# Patient Record
Sex: FEMALE | Race: WHITE | NOT HISPANIC OR LATINO | Employment: OTHER | URBAN - METROPOLITAN AREA
[De-identification: names, ages, dates, MRNs, and addresses within clinical notes are randomized per-mention and may not be internally consistent; named-entity substitution may affect disease eponyms.]

---

## 2017-01-04 ENCOUNTER — HOSPITAL ENCOUNTER (OUTPATIENT)
Dept: LAB | Facility: HOSPITAL | Age: 62
Setting detail: SPECIMEN
Discharge: HOME | End: 2017-01-04
Attending: INTERNAL MEDICINE | Admitting: INTERNAL MEDICINE

## 2017-01-04 LAB
ALBUMIN SERPL-MCNC: 3.8 G/DL (ref 3.5–4.8)
ALBUMIN/GLOB SERPL: 1.2 {RATIO} (ref 1–1.7)
ALP SERPL-CCNC: 67 IU/L (ref 32–91)
ALT SERPL-CCNC: 20 IU/L (ref 14–54)
ANION GAP SERPL CALC-SCNC: 13.7 MMOL/L (ref 10–20)
AST SERPL-CCNC: 24 IU/L (ref 15–41)
BILIRUB SERPL-MCNC: 0.8 MG/DL (ref 0.3–1.2)
BUN SERPL-MCNC: 13 MG/DL (ref 8–20)
BUN/CREAT SERPL: 26 (ref 5.4–26.2)
CALCIUM SERPL-MCNC: 9.9 MG/DL (ref 8.9–10.3)
CHLORIDE SERPL-SCNC: 106 MMOL/L (ref 101–111)
CHOLEST SERPL-MCNC: 199 MG/DL
CHOLEST/HDLC SERPL: 1.9 {RATIO}
CONV CO2: 28 MMOL/L (ref 22–32)
CONV LDL CHOLESTEROL DIRECT: 79 MG/DL (ref 0–100)
CONV TOTAL PROTEIN: 7 G/DL (ref 6.1–7.9)
CREAT UR-MCNC: 0.5 MG/DL (ref 0.4–1)
GLOBULIN UR ELPH-MCNC: 3.2 G/DL (ref 2.5–3.8)
GLUCOSE SERPL-MCNC: ABNORMAL MG/DL (ref 65–99)
HDLC SERPL-MCNC: 105 MG/DL
LDLC/HDLC SERPL: 0.8 {RATIO}
LIPID INTERPRETATION: NORMAL
POTASSIUM SERPL-SCNC: 3.7 MMOL/L (ref 3.6–5.1)
SODIUM SERPL-SCNC: 144 MMOL/L (ref 136–144)
TRIGL SERPL-MCNC: 39 MG/DL
TSH SERPL-ACNC: 1.55 UIU/ML (ref 0.34–5.6)
VLDLC SERPL CALC-MCNC: 15.4 MG/DL

## 2017-01-09 ENCOUNTER — CONVERSION ENCOUNTER (OUTPATIENT)
Dept: ENDOCRINOLOGY | Facility: CLINIC | Age: 62
End: 2017-01-09

## 2017-04-04 ENCOUNTER — HOSPITAL ENCOUNTER (OUTPATIENT)
Dept: LAB | Facility: HOSPITAL | Age: 62
Setting detail: SPECIMEN
Discharge: HOME | End: 2017-04-04
Attending: INTERNAL MEDICINE | Admitting: INTERNAL MEDICINE

## 2017-04-04 LAB
ALBUMIN SERPL-MCNC: 3.6 G/DL (ref 3.5–4.8)
ALBUMIN/GLOB SERPL: 1.2 {RATIO} (ref 1–1.7)
ALP SERPL-CCNC: 61 IU/L (ref 32–91)
ALT SERPL-CCNC: 21 IU/L (ref 14–54)
ANION GAP SERPL CALC-SCNC: 12.6 MMOL/L (ref 10–20)
AST SERPL-CCNC: 22 IU/L (ref 15–41)
BILIRUB SERPL-MCNC: 1.6 MG/DL (ref 0.3–1.2)
BUN SERPL-MCNC: 14 MG/DL (ref 8–20)
BUN/CREAT SERPL: 23.3 (ref 5.4–26.2)
CALCIUM SERPL-MCNC: 9.8 MG/DL (ref 8.9–10.3)
CHLORIDE SERPL-SCNC: 106 MMOL/L (ref 101–111)
CHOLEST SERPL-MCNC: 195 MG/DL
CHOLEST/HDLC SERPL: 2.3 {RATIO}
CONV CO2: 27 MMOL/L (ref 22–32)
CONV LDL CHOLESTEROL DIRECT: 87 MG/DL (ref 0–100)
CONV TOTAL PROTEIN: 6.7 G/DL (ref 6.1–7.9)
CREAT UR-MCNC: 0.6 MG/DL (ref 0.4–1)
GLOBULIN UR ELPH-MCNC: 3.1 G/DL (ref 2.5–3.8)
GLUCOSE SERPL-MCNC: 86 MG/DL (ref 65–99)
HDLC SERPL-MCNC: 86 MG/DL
LDLC/HDLC SERPL: 1 {RATIO}
LIPID INTERPRETATION: ABNORMAL
POTASSIUM SERPL-SCNC: 3.6 MMOL/L (ref 3.6–5.1)
SODIUM SERPL-SCNC: 142 MMOL/L (ref 136–144)
TRIGL SERPL-MCNC: 38 MG/DL
VLDLC SERPL CALC-MCNC: 22.5 MG/DL

## 2017-04-11 ENCOUNTER — CONVERSION ENCOUNTER (OUTPATIENT)
Dept: ENDOCRINOLOGY | Facility: CLINIC | Age: 62
End: 2017-04-11

## 2017-07-07 ENCOUNTER — HOSPITAL ENCOUNTER (OUTPATIENT)
Dept: LAB | Facility: HOSPITAL | Age: 62
Setting detail: SPECIMEN
Discharge: HOME | End: 2017-07-07
Attending: NURSE PRACTITIONER | Admitting: NURSE PRACTITIONER

## 2017-07-07 LAB
ALBUMIN SERPL-MCNC: 3.9 G/DL (ref 3.5–4.8)
ALBUMIN/GLOB SERPL: 1.3 {RATIO} (ref 1–1.7)
ALP SERPL-CCNC: 77 IU/L (ref 32–91)
ALT SERPL-CCNC: 29 IU/L (ref 14–54)
ANION GAP SERPL CALC-SCNC: 13.8 MMOL/L (ref 10–20)
AST SERPL-CCNC: 27 IU/L (ref 15–41)
BILIRUB SERPL-MCNC: 1.1 MG/DL (ref 0.3–1.2)
BUN SERPL-MCNC: 20 MG/DL (ref 8–20)
BUN/CREAT SERPL: 28.6 (ref 5.4–26.2)
CALCIUM SERPL-MCNC: 10.3 MG/DL (ref 8.9–10.3)
CHLORIDE SERPL-SCNC: 102 MMOL/L (ref 101–111)
CHOLEST SERPL-MCNC: 190 MG/DL
CHOLEST/HDLC SERPL: 2.1 {RATIO}
CONV CO2: 28 MMOL/L (ref 22–32)
CONV LDL CHOLESTEROL DIRECT: 85 MG/DL (ref 0–100)
CONV MICROALBUM.,U,RANDOM: 36 MG/L
CONV TOTAL PROTEIN: 7 G/DL (ref 6.1–7.9)
CREAT 24H UR-MCNC: 184.7 MG/DL
CREAT UR-MCNC: 0.7 MG/DL (ref 0.4–1)
GLOBULIN UR ELPH-MCNC: 3.1 G/DL (ref 2.5–3.8)
GLUCOSE SERPL-MCNC: 73 MG/DL (ref 65–99)
HDLC SERPL-MCNC: 91 MG/DL
LDLC/HDLC SERPL: 0.9 {RATIO}
LIPID INTERPRETATION: NORMAL
MICROALBUMIN/CREAT UR: 19.5 UG/MG
POTASSIUM SERPL-SCNC: 3.8 MMOL/L (ref 3.6–5.1)
SODIUM SERPL-SCNC: 140 MMOL/L (ref 136–144)
TRIGL SERPL-MCNC: 51 MG/DL
VLDLC SERPL CALC-MCNC: 14.1 MG/DL

## 2017-07-14 ENCOUNTER — CONVERSION ENCOUNTER (OUTPATIENT)
Dept: ENDOCRINOLOGY | Facility: CLINIC | Age: 62
End: 2017-07-14

## 2017-10-06 ENCOUNTER — HOSPITAL ENCOUNTER (OUTPATIENT)
Dept: LAB | Facility: HOSPITAL | Age: 62
Setting detail: SPECIMEN
Discharge: HOME | End: 2017-10-06
Attending: NURSE PRACTITIONER | Admitting: NURSE PRACTITIONER

## 2017-10-06 LAB
ALBUMIN SERPL-MCNC: 3.7 G/DL (ref 3.5–4.8)
ALBUMIN/GLOB SERPL: 1.1 {RATIO} (ref 1–1.7)
ALP SERPL-CCNC: 85 IU/L (ref 32–91)
ALT SERPL-CCNC: 27 IU/L (ref 14–54)
ANION GAP SERPL CALC-SCNC: 9.3 MMOL/L (ref 10–20)
AST SERPL-CCNC: 23 IU/L (ref 15–41)
BILIRUB SERPL-MCNC: 1.1 MG/DL (ref 0.3–1.2)
BUN SERPL-MCNC: 18 MG/DL (ref 8–20)
BUN/CREAT SERPL: 30 (ref 5.4–26.2)
CALCIUM SERPL-MCNC: 9.6 MG/DL (ref 8.9–10.3)
CHLORIDE SERPL-SCNC: 105 MMOL/L (ref 101–111)
CONV CO2: 28 MMOL/L (ref 22–32)
CONV MICROALBUM.,U,RANDOM: 9 MG/L
CONV TOTAL PROTEIN: 7 G/DL (ref 6.1–7.9)
CREAT 24H UR-MCNC: 64.2 MG/DL
CREAT UR-MCNC: 0.6 MG/DL (ref 0.4–1)
GLOBULIN UR ELPH-MCNC: 3.3 G/DL (ref 2.5–3.8)
GLUCOSE SERPL-MCNC: 317 MG/DL (ref 65–99)
MICROALBUMIN/CREAT UR: 14 UG/MG
POTASSIUM SERPL-SCNC: 4.3 MMOL/L (ref 3.6–5.1)
SODIUM SERPL-SCNC: 138 MMOL/L (ref 136–144)
TSH SERPL-ACNC: 0.64 UIU/ML (ref 0.34–5.6)

## 2017-10-13 ENCOUNTER — CONVERSION ENCOUNTER (OUTPATIENT)
Dept: ENDOCRINOLOGY | Facility: CLINIC | Age: 62
End: 2017-10-13

## 2018-01-05 ENCOUNTER — HOSPITAL ENCOUNTER (OUTPATIENT)
Dept: LAB | Facility: HOSPITAL | Age: 63
Setting detail: SPECIMEN
Discharge: HOME | End: 2018-01-05
Attending: INTERNAL MEDICINE | Admitting: INTERNAL MEDICINE

## 2018-01-05 LAB
ALBUMIN SERPL-MCNC: 3.7 G/DL (ref 3.5–4.8)
ALBUMIN/GLOB SERPL: 1 {RATIO} (ref 1–1.7)
ALP SERPL-CCNC: 84 IU/L (ref 32–91)
ALT SERPL-CCNC: 21 IU/L (ref 14–54)
ANION GAP SERPL CALC-SCNC: 11.5 MMOL/L (ref 10–20)
AST SERPL-CCNC: 23 IU/L (ref 15–41)
BILIRUB SERPL-MCNC: 1.3 MG/DL (ref 0.3–1.2)
BUN SERPL-MCNC: 12 MG/DL (ref 8–20)
BUN/CREAT SERPL: 20 (ref 5.4–26.2)
CALCIUM SERPL-MCNC: 10.1 MG/DL (ref 8.9–10.3)
CHLORIDE SERPL-SCNC: 107 MMOL/L (ref 101–111)
CHOLEST SERPL-MCNC: 191 MG/DL
CHOLEST/HDLC SERPL: 1.9 {RATIO}
CONV CO2: 28 MMOL/L (ref 22–32)
CONV LDL CHOLESTEROL DIRECT: 75 MG/DL (ref 0–100)
CONV MICROALBUM.,U,RANDOM: 16 MG/L
CONV TOTAL PROTEIN: 7.4 G/DL (ref 6.1–7.9)
CREAT 24H UR-MCNC: 74.9 MG/DL
CREAT UR-MCNC: 0.6 MG/DL (ref 0.4–1)
GLOBULIN UR ELPH-MCNC: 3.7 G/DL (ref 2.5–3.8)
GLUCOSE SERPL-MCNC: 141 MG/DL (ref 65–99)
HDLC SERPL-MCNC: 98 MG/DL
LDLC/HDLC SERPL: 0.8 {RATIO}
LIPID INTERPRETATION: NORMAL
MICROALBUMIN/CREAT UR: 21.4 UG/MG
POTASSIUM SERPL-SCNC: 4.5 MMOL/L (ref 3.6–5.1)
SODIUM SERPL-SCNC: 142 MMOL/L (ref 136–144)
TRIGL SERPL-MCNC: 51 MG/DL
VLDLC SERPL CALC-MCNC: 18.2 MG/DL

## 2018-01-10 ENCOUNTER — CONVERSION ENCOUNTER (OUTPATIENT)
Dept: ENDOCRINOLOGY | Facility: CLINIC | Age: 63
End: 2018-01-10

## 2018-05-04 ENCOUNTER — HOSPITAL ENCOUNTER (OUTPATIENT)
Dept: LAB | Facility: HOSPITAL | Age: 63
Setting detail: SPECIMEN
Discharge: HOME | End: 2018-05-04
Attending: NURSE PRACTITIONER | Admitting: NURSE PRACTITIONER

## 2018-05-04 LAB
ALBUMIN SERPL-MCNC: 3.5 G/DL (ref 3.5–4.8)
ALBUMIN/GLOB SERPL: 1 {RATIO} (ref 1–1.7)
ALP SERPL-CCNC: 87 IU/L (ref 32–91)
ALT SERPL-CCNC: 23 IU/L (ref 14–54)
ANION GAP SERPL CALC-SCNC: 11.6 MMOL/L (ref 10–20)
AST SERPL-CCNC: 23 IU/L (ref 15–41)
BILIRUB SERPL-MCNC: 1.2 MG/DL (ref 0.3–1.2)
BUN SERPL-MCNC: 9 MG/DL (ref 8–20)
BUN/CREAT SERPL: 12.9 (ref 5.4–26.2)
CALCIUM SERPL-MCNC: 9.6 MG/DL (ref 8.9–10.3)
CHLORIDE SERPL-SCNC: 104 MMOL/L (ref 101–111)
CONV CO2: 28 MMOL/L (ref 22–32)
CONV MICROALBUM.,U,RANDOM: 11 MG/L
CONV TOTAL PROTEIN: 7 G/DL (ref 6.1–7.9)
CREAT 24H UR-MCNC: 82.4 MG/DL
CREAT UR-MCNC: 0.7 MG/DL (ref 0.4–1)
GLOBULIN UR ELPH-MCNC: 3.5 G/DL (ref 2.5–3.8)
GLUCOSE SERPL-MCNC: 224 MG/DL (ref 65–99)
MICROALBUMIN/CREAT UR: 13.3 UG/MG
POTASSIUM SERPL-SCNC: 3.6 MMOL/L (ref 3.6–5.1)
SODIUM SERPL-SCNC: 140 MMOL/L (ref 136–144)

## 2018-05-11 ENCOUNTER — CONVERSION ENCOUNTER (OUTPATIENT)
Dept: ENDOCRINOLOGY | Facility: CLINIC | Age: 63
End: 2018-05-11

## 2018-10-29 ENCOUNTER — HOSPITAL ENCOUNTER (OUTPATIENT)
Dept: LAB | Facility: HOSPITAL | Age: 63
Setting detail: SPECIMEN
Discharge: HOME | End: 2018-10-29
Attending: INTERNAL MEDICINE | Admitting: INTERNAL MEDICINE

## 2018-10-29 LAB
ALBUMIN SERPL-MCNC: 3.8 G/DL (ref 3.5–4.8)
ALBUMIN/GLOB SERPL: 1 {RATIO} (ref 1–1.7)
ALP SERPL-CCNC: 119 IU/L (ref 32–91)
ALT SERPL-CCNC: 21 IU/L (ref 14–54)
ANION GAP SERPL CALC-SCNC: 11.8 MMOL/L (ref 10–20)
AST SERPL-CCNC: 19 IU/L (ref 15–41)
BILIRUB SERPL-MCNC: 1.1 MG/DL (ref 0.3–1.2)
BUN SERPL-MCNC: 16 MG/DL (ref 8–20)
BUN/CREAT SERPL: 26.7 (ref 5.4–26.2)
CALCIUM SERPL-MCNC: 9.9 MG/DL (ref 8.9–10.3)
CHLORIDE SERPL-SCNC: 103 MMOL/L (ref 101–111)
CHOLEST SERPL-MCNC: 188 MG/DL
CHOLEST/HDLC SERPL: 2.1 {RATIO}
CONV CO2: 29 MMOL/L (ref 22–32)
CONV LDL CHOLESTEROL DIRECT: 84 MG/DL (ref 0–100)
CONV MICROALBUM.,U,RANDOM: 25 MG/L
CONV TOTAL PROTEIN: 7.5 G/DL (ref 6.1–7.9)
CREAT 24H UR-MCNC: 101.9 MG/DL
CREAT UR-MCNC: 0.6 MG/DL (ref 0.4–1)
GLOBULIN UR ELPH-MCNC: 3.7 G/DL (ref 2.5–3.8)
GLUCOSE SERPL-MCNC: 164 MG/DL (ref 65–99)
HDLC SERPL-MCNC: 91 MG/DL
LDLC/HDLC SERPL: 0.9 {RATIO}
LIPID INTERPRETATION: NORMAL
MICROALBUMIN/CREAT UR: 24.5 UG/MG
POTASSIUM SERPL-SCNC: 3.8 MMOL/L (ref 3.6–5.1)
SODIUM SERPL-SCNC: 140 MMOL/L (ref 136–144)
TRIGL SERPL-MCNC: 55 MG/DL
TSH SERPL-ACNC: 1.32 UIU/ML (ref 0.34–5.6)
VLDLC SERPL CALC-MCNC: 13.1 MG/DL

## 2018-10-30 LAB — HBA1C MFR BLD: 11.1 % (ref 0–5.6)

## 2018-11-05 ENCOUNTER — CONVERSION ENCOUNTER (OUTPATIENT)
Dept: ENDOCRINOLOGY | Facility: CLINIC | Age: 63
End: 2018-11-05

## 2019-03-29 ENCOUNTER — CONVERSION ENCOUNTER (OUTPATIENT)
Dept: ENDOCRINOLOGY | Facility: CLINIC | Age: 64
End: 2019-03-29

## 2019-04-06 ENCOUNTER — HOSPITAL ENCOUNTER (OUTPATIENT)
Dept: ULTRASOUND IMAGING | Facility: HOSPITAL | Age: 64
Discharge: HOME | End: 2019-04-06
Attending: INTERNAL MEDICINE | Admitting: INTERNAL MEDICINE

## 2019-04-12 ENCOUNTER — HOSPITAL ENCOUNTER (OUTPATIENT)
Dept: LAB | Facility: HOSPITAL | Age: 64
Setting detail: SPECIMEN
Discharge: HOME | End: 2019-04-12
Attending: INTERNAL MEDICINE | Admitting: INTERNAL MEDICINE

## 2019-05-17 ENCOUNTER — HOSPITAL ENCOUNTER (OUTPATIENT)
Dept: ULTRASOUND IMAGING | Facility: HOSPITAL | Age: 64
Discharge: HOME | End: 2019-05-17
Attending: INTERNAL MEDICINE | Admitting: INTERNAL MEDICINE

## 2019-06-04 VITALS
DIASTOLIC BLOOD PRESSURE: 70 MMHG | HEART RATE: 73 BPM | DIASTOLIC BLOOD PRESSURE: 70 MMHG | HEART RATE: 94 BPM | BODY MASS INDEX: 23.92 KG/M2 | HEIGHT: 62 IN | HEIGHT: 62 IN | HEIGHT: 62 IN | SYSTOLIC BLOOD PRESSURE: 125 MMHG | HEART RATE: 78 BPM | SYSTOLIC BLOOD PRESSURE: 140 MMHG | DIASTOLIC BLOOD PRESSURE: 75 MMHG | DIASTOLIC BLOOD PRESSURE: 84 MMHG | OXYGEN SATURATION: 98 % | WEIGHT: 128 LBS | DIASTOLIC BLOOD PRESSURE: 75 MMHG | HEART RATE: 69 BPM | WEIGHT: 122 LBS | SYSTOLIC BLOOD PRESSURE: 140 MMHG | HEART RATE: 75 BPM | OXYGEN SATURATION: 99 % | SYSTOLIC BLOOD PRESSURE: 125 MMHG | DIASTOLIC BLOOD PRESSURE: 75 MMHG | OXYGEN SATURATION: 98 % | HEART RATE: 84 BPM | OXYGEN SATURATION: 99 % | SYSTOLIC BLOOD PRESSURE: 130 MMHG | DIASTOLIC BLOOD PRESSURE: 75 MMHG | OXYGEN SATURATION: 98 % | HEART RATE: 85 BPM | HEART RATE: 100 BPM | SYSTOLIC BLOOD PRESSURE: 130 MMHG | DIASTOLIC BLOOD PRESSURE: 82 MMHG | BODY MASS INDEX: 23.55 KG/M2 | OXYGEN SATURATION: 99 % | WEIGHT: 118 LBS | WEIGHT: 129 LBS | WEIGHT: 127 LBS | BODY MASS INDEX: 25.21 KG/M2 | WEIGHT: 130 LBS | SYSTOLIC BLOOD PRESSURE: 122 MMHG | OXYGEN SATURATION: 98 % | WEIGHT: 137 LBS | WEIGHT: 119 LBS | OXYGEN SATURATION: 98 % | SYSTOLIC BLOOD PRESSURE: 120 MMHG

## 2019-07-17 PROBLEM — E04.9 ENLARGED THYROID: Status: ACTIVE | Noted: 2017-10-13

## 2019-07-17 RX ORDER — POTASSIUM CHLORIDE 750 MG/1
20 TABLET, FILM COATED, EXTENDED RELEASE ORAL EVERY 24 HOURS
COMMUNITY
Start: 2015-05-29

## 2019-07-17 RX ORDER — PRAVASTATIN SODIUM 40 MG
TABLET ORAL
COMMUNITY
Start: 2014-08-07 | End: 2019-11-10 | Stop reason: SDUPTHER

## 2019-07-19 ENCOUNTER — OFFICE VISIT (OUTPATIENT)
Dept: ENDOCRINOLOGY | Facility: CLINIC | Age: 64
End: 2019-07-19

## 2019-07-19 VITALS
WEIGHT: 135.4 LBS | OXYGEN SATURATION: 97 % | HEART RATE: 64 BPM | DIASTOLIC BLOOD PRESSURE: 80 MMHG | SYSTOLIC BLOOD PRESSURE: 128 MMHG | HEIGHT: 62 IN | BODY MASS INDEX: 24.92 KG/M2

## 2019-07-19 DIAGNOSIS — E78.5 HYPERLIPIDEMIA, UNSPECIFIED HYPERLIPIDEMIA TYPE: ICD-10-CM

## 2019-07-19 DIAGNOSIS — E10.65 TYPE 1 DIABETES MELLITUS WITH HYPERGLYCEMIA (HCC): Primary | ICD-10-CM

## 2019-07-19 DIAGNOSIS — I10 HYPERTENSION, BENIGN: ICD-10-CM

## 2019-07-19 LAB — GLUCOSE BLDC GLUCOMTR-MCNC: 272 MG/DL (ref 70–130)

## 2019-07-19 PROCEDURE — 99214 OFFICE O/P EST MOD 30 MIN: CPT | Performed by: NURSE PRACTITIONER

## 2019-07-19 PROCEDURE — 82962 GLUCOSE BLOOD TEST: CPT | Performed by: NURSE PRACTITIONER

## 2019-07-19 RX ORDER — ACETAMINOPHEN AND CODEINE PHOSPHATE 300; 30 MG/1; MG/1
TABLET ORAL
Refills: 0 | COMMUNITY
Start: 2019-06-19 | End: 2019-10-25

## 2019-07-19 RX ORDER — LATANOPROST 50 UG/ML
SOLUTION/ DROPS OPHTHALMIC
Refills: 2 | COMMUNITY
Start: 2019-05-07

## 2019-07-19 RX ORDER — LANCETS
EACH MISCELLANEOUS DAILY
COMMUNITY
Start: 2011-06-29

## 2019-07-19 NOTE — PROGRESS NOTES
Subjective   Breanna Hernandez is a 64 y.o. female.     History of Present Illness   History of Present Illness:  This is a 64  years old female who presents with follow-up of diabetes Type 1.  She is on Tresiba 24 units and novolog  8units before meals. She has occasiona low BS in the am.BS meter reviewed. No severe hypoglycemia.  She is checking bS 3 times a day. [place] meter reviewed. She had 1 low BS of 54  In past  several months. Blood sugars are high post meals.   She has HLD; She takes pravastatin daily. Excellent control.   The patient but denies polyuria, polydipsia, polyphagia, nocturia, hypoglycemia requiring assistance, self managed hypoglycemia, nocturnal hypoglycemia, hypoglycemic unawareness, weight loss and weight gain.  The patient denies any nausea, vomiting and diarrhea.  Since the last visit the patient admits to HBGM testing:, dietary compliance is fair and complying with medications.  Patient notes eye care since last visit including seen by ophthalmologist.      The following portions of the patient's history were reviewed and updated as appropriate: allergies, current medications, past family history, past medical history, past social history, past surgical history and problem list.    Review of Systems   Constitutional: Negative for appetite change and fatigue.   Eyes: Negative for blurred vision and double vision.   Respiratory: Negative for shortness of breath and wheezing.    Cardiovascular: Negative for chest pain and leg swelling.   Gastrointestinal: Negative for constipation and diarrhea.   Endocrine: Negative for polydipsia, polyphagia and polyuria.   Musculoskeletal: Negative for arthralgias and myalgias.   Skin: Negative for color change and dry skin.   Neurological: Negative for weakness and headache.   Psychiatric/Behavioral: Negative for dysphoric mood and depressed mood.       Objective   Physical Exam  General: Well developed, well nourished, NAD,  Eyes: Pink conjunctivae, No  ptosis.   Neck: No masses, No thyromegaly, trachea midline.  Lungs: CTA with normal respiratory effort  CV: RRR, no murmur rub or gallop.  Musculoskeletal: Normal gait and station. No digital cyanosis.  Extremities: No clubbing, cyanosis, edema, or deformity.   Neurologic: No focal deficits. Cranial nerves intact.  Skin: Warm and dry, No lesions or rashes  Psych: AAOx3 with appropraite affect            Assessment/Plan   Problems Addressed this Visit        Cardiovascular and Mediastinum    Hyperlipidemia     Well controlled. Continue present managment           Relevant Orders    Hemoglobin A1c    Comprehensive Metabolic Panel    Lipid Panel    Hypertension, benign     Well controlled. Continue present managment           Relevant Orders    Hemoglobin A1c    Comprehensive Metabolic Panel    Lipid Panel       Endocrine    Type 1 diabetes mellitus with hyperglycemia (CMS/[facility]) - Primary     A1C 10.2%. Increase lunch and dinner 8 units.  Keep glucose source all the time in case of hypoglycemia, check blood sugar before driving and wear an ID for DM. Treatment of hypoglycemia were discussed with patient and all questions were answered.  .         Relevant Orders    POC Glucose Fingerstick (Completed)    Hemoglobin A1c    Comprehensive Metabolic Panel    Lipid Panel

## 2019-07-19 NOTE — ASSESSMENT & PLAN NOTE
A1C 10.2%. Increase lunch and dinner 8 units.  Keep glucose source all the time in case of hypoglycemia, check blood sugar before driving and wear an ID for DM. Treatment of hypoglycemia were discussed with patient and all questions were answered.  .

## 2019-08-29 RX ORDER — FLURBIPROFEN SODIUM 0.3 MG/ML
SOLUTION/ DROPS OPHTHALMIC
Qty: 350 EACH | Refills: 1 | Status: SHIPPED | OUTPATIENT
Start: 2019-08-29 | End: 2020-02-10

## 2019-10-22 LAB
ALBUMIN SERPL-MCNC: 4.1 G/DL (ref 3.6–4.8)
ALBUMIN/GLOB SERPL: 1.5 {RATIO} (ref 1.2–2.2)
ALP SERPL-CCNC: 93 IU/L (ref 39–117)
ALT SERPL-CCNC: 12 IU/L (ref 0–32)
AMBIG ABBREV CMP14 DEFAULT: NORMAL
AMBIG ABBREV LP DEFAULT: NORMAL
AST SERPL-CCNC: 13 IU/L (ref 0–40)
BILIRUB SERPL-MCNC: 0.7 MG/DL (ref 0–1.2)
BUN SERPL-MCNC: 11 MG/DL (ref 8–27)
BUN/CREAT SERPL: 17 (ref 12–28)
CALCIUM SERPL-MCNC: 9.6 MG/DL (ref 8.7–10.3)
CHLORIDE SERPL-SCNC: 104 MMOL/L (ref 96–106)
CHOLEST SERPL-MCNC: 184 MG/DL (ref 100–199)
CHOLEST/HDLC SERPL: 2.6 RATIO (ref 0–4.4)
CO2 SERPL-SCNC: 23 MMOL/L (ref 20–29)
CREAT SERPL-MCNC: 0.64 MG/DL (ref 0.57–1)
GLOBULIN SER CALC-MCNC: 2.8 G/DL (ref 1.5–4.5)
GLUCOSE SERPL-MCNC: 224 MG/DL (ref 65–99)
HBA1C MFR BLD: 9.4 % (ref 4.8–5.6)
HDLC SERPL-MCNC: 72 MG/DL
LDLC SERPL CALC-MCNC: 95 MG/DL (ref 0–99)
POTASSIUM SERPL-SCNC: 4.2 MMOL/L (ref 3.5–5.2)
PROT SERPL-MCNC: 6.9 G/DL (ref 6–8.5)
SODIUM SERPL-SCNC: 143 MMOL/L (ref 134–144)
TRIGL SERPL-MCNC: 86 MG/DL (ref 0–149)
VLDLC SERPL CALC-MCNC: 17 MG/DL (ref 5–40)

## 2019-10-24 ENCOUNTER — TELEPHONE (OUTPATIENT)
Dept: ENDOCRINOLOGY | Facility: CLINIC | Age: 64
End: 2019-10-24

## 2019-10-25 ENCOUNTER — OFFICE VISIT (OUTPATIENT)
Dept: ENDOCRINOLOGY | Facility: CLINIC | Age: 64
End: 2019-10-25

## 2019-10-25 VITALS
OXYGEN SATURATION: 98 % | WEIGHT: 136 LBS | HEART RATE: 79 BPM | DIASTOLIC BLOOD PRESSURE: 75 MMHG | SYSTOLIC BLOOD PRESSURE: 122 MMHG | BODY MASS INDEX: 25.03 KG/M2 | HEIGHT: 62 IN

## 2019-10-25 DIAGNOSIS — E10.65 TYPE 1 DIABETES MELLITUS WITH HYPERGLYCEMIA (HCC): Primary | ICD-10-CM

## 2019-10-25 DIAGNOSIS — I10 HYPERTENSION, BENIGN: ICD-10-CM

## 2019-10-25 DIAGNOSIS — E78.2 MIXED HYPERLIPIDEMIA: ICD-10-CM

## 2019-10-25 PROCEDURE — 99214 OFFICE O/P EST MOD 30 MIN: CPT | Performed by: INTERNAL MEDICINE

## 2019-10-25 RX ORDER — INFLUENZA VIRUS VACCINE 15; 15; 15; 15 UG/.5ML; UG/.5ML; UG/.5ML; UG/.5ML
SUSPENSION INTRAMUSCULAR
Refills: 0 | COMMUNITY
Start: 2019-10-16 | End: 2019-10-25

## 2019-10-25 NOTE — PATIENT INSTRUCTIONS
Continue current doses of insulin  Always keep glucose source with you in case of low blood sugar  Please make sure sugars above 100 while driving  Always get annual eye exam and flu vaccine  Follow-up in 6 months with labs

## 2019-10-25 NOTE — PROGRESS NOTES
Endocrine Progress Note Outpatient     Patient Care Team:  Wood Winslow MD as PCP - General  Wood Winslow MD as PCP - Family Medicine    Chief Complaint: Follow-up type 1 diabetes    HPI: 64-year-old female with history of type 1 diabetes, hypertension, hyperlipidemia is here for follow-up.  For type 1 diabetes she is on Tresiba 24 units subcu daily along with NovoLog 8 units with each meal.  She did bring in blood sugar records with regards to his meter her meter and I reviewed that she is running at least 142 400 blood sugars.  No low blood sugars noted.  Hypertension: Well-controlled  Hyperlipidemia: On pravastatin.    Past Medical History:   Diagnosis Date   • Cataract    • Diabetes mellitus type I (CMS/[facility])        Social History     Socioeconomic History   • Marital status:      Spouse name: Not on file   • Number of children: Not on file   • Years of education: Not on file   • Highest education level: Not on file   Tobacco Use   • Smoking status: Never Smoker   Substance and Sexual Activity   • Alcohol use: No     Frequency: Never   • Drug use: No   • Sexual activity: Defer       Family History   Problem Relation Age of Onset   • Cancer Mother         colon cancer   • Heart disease Other         uncle   • Diabetes Other         grandmother, uncle       No Known Allergies    ROS:   Constitutional:  Denies fatigue, tiredness.    Eyes:  Denies change in visual acuity   HENT:  Denies nasal congestion or sore throat   Respiratory: denies cough, shortness of breath.   Cardiovascular:  denies chest pain, edema   GI:  Denies abdominal pain, nausea, vomiting.   Musculoskeletal:  Denies back pain or joint pain   Integument:  Denies dry skin and rash   Neurologic:  Denies headache, focal weakness or sensory changes   Endocrine:  Denies polyuria or polydipsia   Psychiatric:  Denies depression or anxiety      Vitals:    10/25/19 1433   BP: 122/75   Pulse: 79   SpO2: 98%       Physical  Exam:  GEN: NAD, conversant  EYES: EOMI, PERRL, no conjunctival erythema  NECK: no thyromegaly, full ROM   CV: RRR, no murmurs/rubs/gallops, no peripheral edema  LUNG: CTAB, no wheezes/rales/ronchi  SKIN: no rashes, no acanthosis  MSK: no deformities, full ROM of all extremities  NEURO: no tremors, DTR normal  PSYCH: AOX3, appropriate mood, affect normal      Results Review:     I reviewed the patient's new clinical results.    Lab Results   Component Value Date    HGBA1C 9.4 (H) 10/21/2019    HGBA1C 11.1 (H) 10/29/2018      Lab Results   Component Value Date    GLUCOSE 164 (H) 10/29/2018    BUN 11 10/21/2019    CREATININE 0.64 10/21/2019    EGFRIFNONA 95 10/21/2019    EGFRIFAFRI 110 10/21/2019    BCR 17 10/21/2019    K 4.2 10/21/2019    CO2 23 10/21/2019    CALCIUM 9.6 10/21/2019    PROTENTOTREF 6.9 10/21/2019    ALBUMIN 4.1 10/21/2019    LABIL2 1.5 10/21/2019    AST 13 10/21/2019    ALT 12 10/21/2019    CHOL 188 10/29/2018    TRIG 86 10/21/2019    LDL 95 10/21/2019    HDL 72 10/21/2019     Lab Results   Component Value Date    TSH 1.32 10/29/2018         Medication Review: Reviewed.       Current Outpatient Medications:   •  aspirin 81 MG tablet, ASPIRIN 81 MG ORAL TABLET, Disp: , Rfl:   •  B-D UF III MINI PEN NEEDLES 31G X 5 MM misc, USE TO INJECT INSULIN 4 TIMES DAILY, Disp: 350 each, Rfl: 1  •  glucose blood (GLUCOSE METER TEST) test strip, 1 each by Other route As Needed ([place] METER/ TEST STRIP). Use as instructed, Disp: , Rfl:   •  insulin aspart (NOVOLOG FLEXPEN) 100 UNIT/ML solution pen-injector sc pen, Inject 8 units with breakfast, 9 units with lunch and dinner. Plus SSI Max daily dose. 30 units. , Disp: , Rfl:   •  insulin degludec (TRESIBA FLEXTOUCH) 100 UNIT/ML solution pen-injector injection, 24 UNITS AT NIGHT, Disp: , Rfl:   •  latanoprost (XALATAN) 0.005 % ophthalmic solution, INT 1 GTT IN OU HS, Disp: , Rfl: 2  •  MICROLET LANCETS misc, Daily., Disp: , Rfl:   •  potassium chloride (KLOR-CON) 10  MEQ CR tablet, 20 mEq Daily., Disp: , Rfl:   •  pravastatin (PRAVACHOL) 40 MG tablet, PRAVASTATIN SODIUM 40 MG TABS, Disp: , Rfl:       Assessment/Plan   1.  Diabetes mellitus type 1: Uncontrolled but has improved.  She is comfortable where she is and she does not want to increase her insulin dose at this time.  She does understand the risks and complications of uncontrolled diabetes including but not limited to diabetic neuropathy which could lead to amputations, blindness, kidney failure, CAD, CVA, infection and death.  Advised to always keep glucose source with her in case of low blood sugars and make sure sugars above 100 while she is driving.  2.  Hypertension: Well-controlled, continue current medication  3.  Hyperlipidemia: Well-controlled, continue current medications.          Paul Singleton MD FACE.    Much of the above report is an electronic transcription/translation of the spoken language to printed text using Dragon Software. As such, the subtleties and finesse of the spoken language may permit erroneous, or at times, nonsensical words or phrases to be inadvertently transcribed; thus changes may be made at a later date to rectify these errors.

## 2019-10-31 ENCOUNTER — TELEPHONE (OUTPATIENT)
Dept: ENDOCRINOLOGY | Facility: CLINIC | Age: 64
End: 2019-10-31

## 2019-10-31 DIAGNOSIS — E04.9 ENLARGED THYROID: Primary | ICD-10-CM

## 2019-10-31 NOTE — TELEPHONE ENCOUNTER
Called pt regarding thyroid us due in November. Pt states that she will go to [facility]. Contact info for scheduling given- pt will call to schedule.

## 2019-11-12 RX ORDER — PRAVASTATIN SODIUM 40 MG
TABLET ORAL
Qty: 90 TABLET | Refills: 0 | Status: SHIPPED | OUTPATIENT
Start: 2019-11-12

## 2019-11-15 ENCOUNTER — HOSPITAL ENCOUNTER (OUTPATIENT)
Dept: ULTRASOUND IMAGING | Facility: HOSPITAL | Age: 64
Discharge: HOME | End: 2019-11-15
Admitting: INTERNAL MEDICINE

## 2019-11-15 DIAGNOSIS — E04.9 ENLARGED THYROID: ICD-10-CM

## 2019-11-15 PROCEDURE — 76536 US EXAM OF HEAD AND NECK: CPT

## 2019-11-18 DIAGNOSIS — E04.9 ENLARGED THYROID: Primary | ICD-10-CM

## 2020-04-07 RX ORDER — POTASSIUM CHLORIDE 750 MG/1
TABLET, FILM COATED, EXTENDED RELEASE ORAL
Qty: 180 TABLET | OUTPATIENT
Start: 2020-04-07

## 2020-04-22 LAB
ALBUMIN SERPL-MCNC: 4.1 G/DL (ref 3.8–4.8)
ALBUMIN/CREAT UR: 31 MG/G CREAT (ref 0–29)
ALBUMIN/GLOB SERPL: 1.5 {RATIO} (ref 1.2–2.2)
ALP SERPL-CCNC: 79 IU/L (ref 39–117)
ALT SERPL-CCNC: 10 IU/L (ref 0–32)
AMBIG ABBREV CMP14 DEFAULT: NORMAL
AMBIG ABBREV LP DEFAULT: NORMAL
AST SERPL-CCNC: 18 IU/L (ref 0–40)
BILIRUB SERPL-MCNC: 1.4 MG/DL (ref 0–1.2)
BUN SERPL-MCNC: 11 MG/DL (ref 8–27)
BUN/CREAT SERPL: 17 (ref 12–28)
CALCIUM SERPL-MCNC: 9.4 MG/DL (ref 8.7–10.3)
CHLORIDE SERPL-SCNC: 101 MMOL/L (ref 96–106)
CHOLEST SERPL-MCNC: 209 MG/DL (ref 100–199)
CO2 SERPL-SCNC: 23 MMOL/L (ref 20–29)
CREAT SERPL-MCNC: 0.65 MG/DL (ref 0.57–1)
CREAT UR-MCNC: 102.9 MG/DL
GLOBULIN SER CALC-MCNC: 2.8 G/DL (ref 1.5–4.5)
GLUCOSE SERPL-MCNC: 214 MG/DL (ref 65–99)
HBA1C MFR BLD: 9.7 % (ref 4.8–5.6)
HDLC SERPL-MCNC: 89 MG/DL
LDLC SERPL CALC-MCNC: 106 MG/DL (ref 0–99)
MICROALBUMIN UR-MCNC: 31.4 UG/ML
POTASSIUM SERPL-SCNC: 3.8 MMOL/L (ref 3.5–5.2)
PROT SERPL-MCNC: 6.9 G/DL (ref 6–8.5)
SODIUM SERPL-SCNC: 140 MMOL/L (ref 134–144)
TRIGL SERPL-MCNC: 69 MG/DL (ref 0–149)
VLDLC SERPL CALC-MCNC: 14 MG/DL (ref 5–40)

## 2020-06-29 RX ORDER — PRAVASTATIN SODIUM 40 MG
TABLET ORAL
Qty: 90 TABLET | Refills: 0 | OUTPATIENT
Start: 2020-06-29

## 2020-06-29 RX ORDER — INSULIN DEGLUDEC INJECTION 100 U/ML
INJECTION, SOLUTION SUBCUTANEOUS
Qty: 30 ML | OUTPATIENT
Start: 2020-06-29

## 2020-09-14 RX ORDER — POTASSIUM CHLORIDE 750 MG/1
TABLET, FILM COATED, EXTENDED RELEASE ORAL
Qty: 180 TABLET | OUTPATIENT
Start: 2020-09-14

## 2021-02-23 RX ORDER — FLURBIPROFEN SODIUM 0.3 MG/ML
SOLUTION/ DROPS OPHTHALMIC
Qty: 360 EACH | Refills: 1 | OUTPATIENT
Start: 2021-02-23